# Patient Record
Sex: FEMALE | Race: WHITE | NOT HISPANIC OR LATINO | URBAN - METROPOLITAN AREA
[De-identification: names, ages, dates, MRNs, and addresses within clinical notes are randomized per-mention and may not be internally consistent; named-entity substitution may affect disease eponyms.]

---

## 2017-09-11 ENCOUNTER — OUTPATIENT (OUTPATIENT)
Dept: OUTPATIENT SERVICES | Facility: HOSPITAL | Age: 77
LOS: 1 days | Discharge: HOME | End: 2017-09-11

## 2017-09-11 DIAGNOSIS — Z12.31 ENCOUNTER FOR SCREENING MAMMOGRAM FOR MALIGNANT NEOPLASM OF BREAST: ICD-10-CM

## 2018-07-05 ENCOUNTER — EMERGENCY (EMERGENCY)
Facility: HOSPITAL | Age: 78
LOS: 0 days | Discharge: HOME | End: 2018-07-05
Attending: EMERGENCY MEDICINE | Admitting: EMERGENCY MEDICINE

## 2018-07-05 VITALS
TEMPERATURE: 98 F | DIASTOLIC BLOOD PRESSURE: 82 MMHG | OXYGEN SATURATION: 100 % | HEART RATE: 75 BPM | RESPIRATION RATE: 18 BRPM | SYSTOLIC BLOOD PRESSURE: 178 MMHG

## 2018-07-05 DIAGNOSIS — M25.511 PAIN IN RIGHT SHOULDER: ICD-10-CM
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
## 2018-07-05 DIAGNOSIS — E11.9 TYPE 2 DIABETES MELLITUS WITHOUT COMPLICATIONS: ICD-10-CM

## 2018-07-05 DIAGNOSIS — I10 ESSENTIAL (PRIMARY) HYPERTENSION: ICD-10-CM
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
## 2018-07-05 DIAGNOSIS — S09.90XA UNSPECIFIED INJURY OF HEAD, INITIAL ENCOUNTER: ICD-10-CM
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
## 2018-07-05 NOTE — ED PROVIDER NOTE - NS ED ROS FT
Constitutional: No fevers/chills.    Head: No injury, headache.    Eyes:  No visual changes, eye pain or discharge. No injury.    ENMT:  No hearing changes, pain, discharge or infections. No neck pain or stiffness. No loss ROM.    Cardiac:  No chest pain, SOB or edema. No chest pain with exertion.    Respiratory:  No cough or respiratory distress.     GI:  No nausea, vomiting, diarrhea or abdominal pain. No anorexia. No change in PO intake.    :  No frequency, urgency or burning. No change in urine output.    MS:  No leg pain, leg swelling, myalgia, muscle weakness, (+) joint pain (-) back pain. No loss ROM.    Neuro:  No dizziness or weakness.  No LOC.    Skin:  No skin rash.

## 2018-07-05 NOTE — ED PROVIDER NOTE - OBJECTIVE STATEMENT
76 yo female with PMHx HTN, DM presents for fall onto right shoulder from recliner. Patient states she stood up from recliner and tripped and fell onto right shoulder and hit frontal right head and has small ecchymosis over that area. Patient states she has previous fracture to right shoulder. Patient/family denies LOC, fevers, chest pain, SOB, abdominal pain, nausea, vomiting, dizziness, weakness, changes in mental status.

## 2018-07-05 NOTE — ED PROVIDER NOTE - ATTENDING CONTRIBUTION TO CARE
77y f on asa h/o htn, dm, OP p/w R shoulder pain s/p mechanical fall. Was sitting in a recliner @ home, stood up tripped and fell onto R shoulder. +Head trauma, sustained contusion over R frontal forehead. No loc. Now c/o R shoulder pain. Also rpts previous fx of R shoulder. Denies ha, dizziness, blurry vision, neck pain or stiffness, cp/sob, nv, abd pain, FNDs. PE: elderly f nad, mild contusion to R frontal forhead no lacs or depressions remainder of head ncat, perrl/eomi, neck supple no midline or paraspinal ttp, chest atraumatic, rrr nl s1s2, ctab, abd soft ntnd, back non-tender, ext- R shoulder +ttp, rom intact but limited sec to pain, remainder of RUE nvid, remainder of ext exam nl. a/p: forehead contusion on asa & R shoulder pain s/p mechanical fall - imaging, pain control, reassess.

## 2018-07-05 NOTE — ED PROVIDER NOTE - PHYSICAL EXAMINATION
GEN: Well appearing, in no apparent distress.    HEAD:  Normocephalic, (+) small ecchymosis over right frontal bone near hair line No lacerations or abrasions.    EYES:  Clear conjunctivae without injection, drainage or discharge.    ENMT:  Nasal mucosa moist; mouth moist without ulcerations or lesions; throat moist without erythema, exudate, ulcerations or lesions.    NECK:  Supple, no masses. Normal ROM. No cervical midline tenderness.    CARDIAC:  RRR, normal S1 and S2, no murmurs, rubs or gallops.    RESP:  Respiratory rate and effort appear normal; lungs are clear to auscultation bilaterally; no rhonchi, rales or wheezes.    ABDOMEN:  Soft, non-tender, non-distended, no masses. Normal BS throughout.  BACK: No midline spinal tenderness.    MUSCULOSKELETAL: No leg swelling, no calf tenderness. Patient able to ambulate without difficulty.  NEURO:  AAO x 3. Motor 5/5. Sensory intact. CN II – XII intact.     SKIN:  Normal skin color for age and race, well-perfused; warm and dry.

## 2018-07-07 NOTE — ED POST DISCHARGE NOTE - RESULT SUMMARY
+ NEW SHOULDER FX NOTED, CALLED PATIENT AND TOLD HER, SHE HAS SLING, AND HAS AN ORTHOPEDIST, AS SHE HAS FX'D THIS SAME SHOULDER IN THE PAST.

## 2019-03-22 NOTE — ED ADULT NURSE NOTE - DISCHARGE DATE/TIME
Patient Education     Understanding Lumbar Radiculopathy    Lumbar radiculopathy is irritation or inflammation of a nerve root in the low back. It causes symptoms that spread out from the back down one or both legs. To understand this condition, it helps to understand the parts of the spine:  · Vertebrae. These are bones that stack to form the spine. The lumbar spine contains the 5 bottom vertebrae.  · Disks. These are soft pads of tissue between the vertebrae. They act as shock absorbers for the spine.  · Spinal canal. This is a tunnel formed within the stacked vertebrae. In the lumbar spine, nerves run through this canal.  · Nerves. These branch off and leave the spinal canal, traveling out to parts of the body. As they leave the spinal canal, nerves pass through openings between the vertebrae. The nerve root is the part of the nerve that is closest to the spinal canal.  · Sciatic nerve. This is a large nerve formed from several nerve roots in the low back. This nerve extends down the back of the leg to the foot.  With lumbar radiculopathy, nerve roots in the low back become irritated. This leads to pain and symptoms. The sciatic nerve is commonly involved, so the condition is often called sciatica.  What causes lumbar radiculopathy?  Aging, injury, poor posture, extra body weight, and other issues can lead to problems in the low back. These problems may then irritate nerve roots. They include:  · Damage to a disk in the lumbar spine. The damaged disk may then press on nearby nerve roots.  · Degeneration from wear and tear, and aging. This can lead to narrowing (stenosis) of the openings between the vertebrae. The narrowed openings press on nerve roots as they leave the spinal canal.  · Unstable spine. This is when a vertebra slips forward. It can then press on a nerve root.  Other, less common things can put pressure on nerves in the low back. These include diabetes, infection, or a tumor.  Symptoms of lumbar  radiculopathy  These include:  · Pain in the low back  · Pain, numbness, tingling, or weakness that travels into the buttocks, hip, groin, or leg  · Muscle spasms  Treatment for lumbar radiculopathy  In most cases, your healthcare provider will first try treatments that help relieve symptoms. These may include:  · Prescription and over-the-counter pain medicines. These help relieve pain, swelling, and irritation.  · Limits on positions and activities that increase pain. But lying in bed or avoiding all movement is only recommended for a short period of time.  · Physical therapy, including exercises and stretches. This helps decrease pain and increase movement and function.  · Steroid shots into the lower back. This may help relieve symptoms for a time.  · Weight-loss program. If you are overweight, losing extra pounds may help relieve symptoms.  In some cases, you may need surgery to fix the underlying problem. This depends on the cause, the symptoms, and how long the pain has lasted.  Possible complications  Over time, an irritated and inflamed nerve may become damaged. This may lead to long-lasting (permanent) numbness or weakness in your legs and feet. If symptoms change suddenly or get worse, be sure to let your healthcare provider know.  When to call your healthcare provider  Call your healthcare provider right away if you have any of these:  · New pain or pain that gets worse  · New or increasing weakness, tingling, or numbness in your leg or foot  · Problems controlling your bladder or bowel   Date Last Reviewed: 3/10/2016  © 8097-1050 [employer]. [address] [place] [address], [place] [address]. All rights reserved. This information is not intended as a substitute for professional medical care. Always follow your healthcare professional's instructions.            05-Jul-2018 05:31

## 2019-08-17 ENCOUNTER — OUTPATIENT (OUTPATIENT)
Dept: OUTPATIENT SERVICES | Facility: HOSPITAL | Age: 79
LOS: 1 days | Discharge: HOME | End: 2019-08-17

## 2019-08-17 DIAGNOSIS — J44.9 CHRONIC OBSTRUCTIVE PULMONARY DISEASE, UNSPECIFIED: ICD-10-CM

## 2019-08-17 DIAGNOSIS — E87.1 HYPO-OSMOLALITY AND HYPONATREMIA: ICD-10-CM

## 2019-08-17 DIAGNOSIS — I10 ESSENTIAL (PRIMARY) HYPERTENSION: ICD-10-CM

## 2019-08-17 PROBLEM — E11.9 TYPE 2 DIABETES MELLITUS WITHOUT COMPLICATIONS: Chronic | Status: ACTIVE | Noted: 2018-07-05

## 2019-08-20 ENCOUNTER — OUTPATIENT (OUTPATIENT)
Dept: OUTPATIENT SERVICES | Facility: HOSPITAL | Age: 79
LOS: 1 days | Discharge: HOME | End: 2019-08-20

## 2019-08-20 DIAGNOSIS — E87.1 HYPO-OSMOLALITY AND HYPONATREMIA: ICD-10-CM

## 2019-08-20 DIAGNOSIS — I10 ESSENTIAL (PRIMARY) HYPERTENSION: ICD-10-CM

## 2019-09-07 ENCOUNTER — OUTPATIENT (OUTPATIENT)
Dept: OUTPATIENT SERVICES | Facility: HOSPITAL | Age: 79
LOS: 1 days | Discharge: HOME | End: 2019-09-07

## 2019-09-07 DIAGNOSIS — R50.9 FEVER, UNSPECIFIED: ICD-10-CM

## 2020-01-27 ENCOUNTER — OUTPATIENT (OUTPATIENT)
Dept: OUTPATIENT SERVICES | Facility: HOSPITAL | Age: 80
LOS: 1 days | Discharge: HOME | End: 2020-01-27

## 2020-01-27 ENCOUNTER — OUTPATIENT (OUTPATIENT)
Dept: OUTPATIENT SERVICES | Facility: HOSPITAL | Age: 80
LOS: 1 days | Discharge: HOME | End: 2020-01-27
Payer: COMMERCIAL

## 2020-01-27 DIAGNOSIS — R13.10 DYSPHAGIA, UNSPECIFIED: ICD-10-CM

## 2020-01-27 PROCEDURE — 74230 X-RAY XM SWLNG FUNCJ C+: CPT | Mod: 26

## 2020-01-28 DIAGNOSIS — R13.10 DYSPHAGIA, UNSPECIFIED: ICD-10-CM

## 2020-02-05 DIAGNOSIS — Z02.9 ENCOUNTER FOR ADMINISTRATIVE EXAMINATIONS, UNSPECIFIED: ICD-10-CM

## 2022-11-22 NOTE — ED ADULT NURSE NOTE - DURATION
Prior Authorization Retail Medication Request    Medication/Dose: Ubrelvy 50 mg  ICD code (if different than what is on RX):  G43.109  Previously Tried and Failed: Would avoid estrogen or estrogen containing products  Would avoid triptans for now as well    Rationale:  question hemiplegic migraine.     Insurance Name: [employer]  Insurance ID:  [id number]     Pharmacy Information (if different than what is on RX)  Name:   Phone:     today

## 2024-01-10 NOTE — ED PROVIDER NOTE - PROGRESS NOTE DETAILS
Fax received from Dr. Cottrell requesting clearance for patient to complete dental filling and periodontal scaling/root planing.    Per Dr. Anton:  hold off on dental procedure until patient has completed adjuvant oral chemotherapy (Xeloda).  Tentative completion date of Xeloda x8 cycles; May 2024.    Fax completed, reviewed, and signed by Dr. Anton.  Form returned via fax.    Dr. Lalita Cedillo  [address] [place] [address]  Phone:  [phone]  Fax:  [phone]   all results d/w pt - advised of poss new R shoulder fx, diff to determine sec to old fx findings - will place sling and given Ortho f/u

## 2025-06-09 NOTE — ED ADULT NURSE NOTE - OBJECTIVE STATEMENT
78 y/o female presents to the ED c/o R upper arm pain s/p falling when standing up from recliner. Pt reports hitting her head but denies LOC, nausea, vomiting, visual disturbances or dizziness. Ecchymotic area noted to R forehead. Endorses pain with movement of R arm. No deformities or open injuries noted.
(4) no limitation